# Patient Record
Sex: FEMALE | Race: BLACK OR AFRICAN AMERICAN | NOT HISPANIC OR LATINO | Employment: UNEMPLOYED | URBAN - METROPOLITAN AREA
[De-identification: names, ages, dates, MRNs, and addresses within clinical notes are randomized per-mention and may not be internally consistent; named-entity substitution may affect disease eponyms.]

---

## 2017-12-10 ENCOUNTER — HOSPITAL ENCOUNTER (EMERGENCY)
Facility: HOSPITAL | Age: 37
Discharge: HOME | End: 2017-12-11
Attending: EMERGENCY MEDICINE
Payer: MEDICAID

## 2017-12-10 DIAGNOSIS — R05.9 COUGH: Primary | ICD-10-CM

## 2017-12-10 LAB
FLUAV AG SPEC QL IA: NEGATIVE
FLUBV AG SPEC QL IA: NEGATIVE
SPECIMEN SOURCE: NORMAL

## 2017-12-10 PROCEDURE — 63600175 PHARM REV CODE 636 W HCPCS: Performed by: EMERGENCY MEDICINE

## 2017-12-10 PROCEDURE — 25000003 PHARM REV CODE 250: Performed by: EMERGENCY MEDICINE

## 2017-12-10 PROCEDURE — 87400 INFLUENZA A/B EACH AG IA: CPT

## 2017-12-10 PROCEDURE — 99283 EMERGENCY DEPT VISIT LOW MDM: CPT

## 2017-12-10 RX ORDER — PREDNISONE 20 MG/1
60 TABLET ORAL
Status: COMPLETED | OUTPATIENT
Start: 2017-12-10 | End: 2017-12-10

## 2017-12-10 RX ORDER — BENZONATATE 100 MG/1
200 CAPSULE ORAL
Status: COMPLETED | OUTPATIENT
Start: 2017-12-10 | End: 2017-12-10

## 2017-12-10 RX ORDER — CETIRIZINE HYDROCHLORIDE 10 MG/1
10 TABLET ORAL
Status: COMPLETED | OUTPATIENT
Start: 2017-12-10 | End: 2017-12-10

## 2017-12-10 RX ADMIN — PREDNISONE 60 MG: 20 TABLET ORAL at 11:12

## 2017-12-10 RX ADMIN — CETIRIZINE HYDROCHLORIDE 10 MG: 10 TABLET, FILM COATED ORAL at 11:12

## 2017-12-10 RX ADMIN — BENZONATATE 200 MG: 100 CAPSULE ORAL at 11:12

## 2017-12-11 VITALS
DIASTOLIC BLOOD PRESSURE: 85 MMHG | HEIGHT: 62 IN | SYSTOLIC BLOOD PRESSURE: 133 MMHG | TEMPERATURE: 98 F | HEART RATE: 88 BPM | RESPIRATION RATE: 20 BRPM | WEIGHT: 210 LBS | OXYGEN SATURATION: 95 % | BODY MASS INDEX: 38.64 KG/M2

## 2017-12-11 RX ORDER — BENZONATATE 200 MG/1
200 CAPSULE ORAL 3 TIMES DAILY PRN
Qty: 30 CAPSULE | Refills: 0 | Status: SHIPPED | OUTPATIENT
Start: 2017-12-11 | End: 2017-12-21

## 2017-12-11 NOTE — DISCHARGE INSTRUCTIONS
Take an antihistamine like zyrtec or allegra or claritin daily.  Take Mucinex, or Mucinex DM or Delsym.  You may use Afrin for congestion.  Drink plenty of clear fluids.  You were given a dose of steroids in the ER.

## 2017-12-11 NOTE — ED PROVIDER NOTES
Encounter Date: 12/10/2017       History     Chief Complaint   Patient presents with    Shortness of Breath     reports SOB since last night. Denies any past medical hx. States has been wheezing. Pt reports has been having cough X 2 days, nonproductive     37F presents with cough and SOB x 2 days.  She has been taking Mucinex for her cough.  Now her ribs are sore.  She also reports associated runny nose and congestion.  No fever.  Sometimes with coughing she feels short of breath.  She thinks she may also wheeze at times.            Review of patient's allergies indicates:   Allergen Reactions    Ibuprofen Shortness Of Breath    Naproxen Shortness Of Breath     History reviewed. No pertinent past medical history.  Past Surgical History:   Procedure Laterality Date     SECTION      HYSTERECTOMY       History reviewed. No pertinent family history.  Social History   Substance Use Topics    Smoking status: Never Smoker    Smokeless tobacco: Never Used    Alcohol use No     Review of Systems   Constitutional: Negative for fever.   HENT: Positive for congestion and rhinorrhea.    Respiratory: Positive for cough and shortness of breath.    All other systems reviewed and are negative.      Physical Exam     Initial Vitals [12/10/17 2159]   BP Pulse Resp Temp SpO2   (!) 139/97 90 20 97.7 °F (36.5 °C) 97 %      MAP       111         Physical Exam    Nursing note and vitals reviewed.  Constitutional: She appears well-developed and well-nourished. No distress.   HENT:   Head: Normocephalic and atraumatic.   Right Ear: Tympanic membrane normal.   Left Ear: Tympanic membrane normal.   Mouth/Throat: Oropharynx is clear and moist.   Eyes: Conjunctivae are normal.   Neck: Normal range of motion.   Cardiovascular: Normal rate, regular rhythm and normal heart sounds.   No murmur heard.  Pulmonary/Chest: Breath sounds normal. No respiratory distress. She has no wheezes. She has no rhonchi. She has no rales.    Musculoskeletal: Normal range of motion.   Neurological: She is alert and oriented to person, place, and time.   Skin: Skin is warm and dry.   Psychiatric: She has a normal mood and affect. Her behavior is normal.         ED Course   Procedures  Labs Reviewed   INFLUENZA A AND B ANTIGEN             Medical Decision Making:   Clinical Tests:   Lab Tests: Ordered and Reviewed  Radiological Study: Ordered and Reviewed  ED Management:  37F with cough, congestion, runny nose and rib soreness.  No fever, no OM or exudative pharyngitis on exam.  Flu negative and no infiltrate on CXR.  I will treat with PO steroid dose in ER, antihistamines, and tessalon perles.  Symptomatic treatment.                   ED Course      Clinical Impression:   The encounter diagnosis was Cough.                           Paula Castle MD  12/11/17 0058

## 2017-12-11 NOTE — ED NOTES
Pt to ED with complaints of shortness of breath. States it started last night when she was helping to put her Pj tree up. Pt states she has been having a nonproductive cough that is frequent for about 2 days. States she was showering tonight trying to help loosen her chest congestion when she felt short of breath. States she is less SOB now that she has rested, but pt continuing to cough frequently at this time as well. States she last felt this way when she was diagnosed with bronchitis a few years ago.

## 2017-12-11 NOTE — ED NOTES
Pt states she does not feel better at this time as far as her cough in concerned, but she states she is able to breath better at this time. Denies SOB at this time.

## 2024-10-24 ENCOUNTER — HOSPITAL ENCOUNTER (EMERGENCY)
Facility: HOSPITAL | Age: 44
Discharge: HOME | End: 2024-10-25
Attending: EMERGENCY MEDICINE
Payer: MEDICAID

## 2024-10-24 DIAGNOSIS — R05.9 COUGH: ICD-10-CM

## 2024-10-24 DIAGNOSIS — R00.0 TACHYCARDIA: ICD-10-CM

## 2024-10-24 DIAGNOSIS — J40 BRONCHITIS: Primary | ICD-10-CM

## 2024-10-24 PROCEDURE — 94640 AIRWAY INHALATION TREATMENT: CPT

## 2024-10-24 PROCEDURE — 93010 ELECTROCARDIOGRAM REPORT: CPT | Mod: ,,, | Performed by: INTERNAL MEDICINE

## 2024-10-24 PROCEDURE — 99285 EMERGENCY DEPT VISIT HI MDM: CPT | Mod: 25

## 2024-10-24 PROCEDURE — 93005 ELECTROCARDIOGRAM TRACING: CPT

## 2024-10-24 PROCEDURE — 63600175 PHARM REV CODE 636 W HCPCS: Performed by: EMERGENCY MEDICINE

## 2024-10-24 PROCEDURE — 25000242 PHARM REV CODE 250 ALT 637 W/ HCPCS: Performed by: EMERGENCY MEDICINE

## 2024-10-24 RX ORDER — PREDNISONE 20 MG/1
60 TABLET ORAL
Status: COMPLETED | OUTPATIENT
Start: 2024-10-24 | End: 2024-10-24

## 2024-10-24 RX ORDER — IPRATROPIUM BROMIDE AND ALBUTEROL SULFATE 2.5; .5 MG/3ML; MG/3ML
3 SOLUTION RESPIRATORY (INHALATION)
Status: COMPLETED | OUTPATIENT
Start: 2024-10-24 | End: 2024-10-24

## 2024-10-24 RX ADMIN — IPRATROPIUM BROMIDE AND ALBUTEROL SULFATE 3 ML: .5; 3 SOLUTION RESPIRATORY (INHALATION) at 11:10

## 2024-10-24 RX ADMIN — PREDNISONE 60 MG: 20 TABLET ORAL at 11:10

## 2024-10-25 VITALS
OXYGEN SATURATION: 99 % | RESPIRATION RATE: 19 BRPM | WEIGHT: 291 LBS | TEMPERATURE: 98 F | SYSTOLIC BLOOD PRESSURE: 141 MMHG | HEART RATE: 107 BPM | HEIGHT: 61 IN | BODY MASS INDEX: 54.94 KG/M2 | DIASTOLIC BLOOD PRESSURE: 89 MMHG

## 2024-10-25 RX ORDER — PROMETHAZINE HYDROCHLORIDE AND DEXTROMETHORPHAN HYDROBROMIDE 6.25; 15 MG/5ML; MG/5ML
7.5 SYRUP ORAL EVERY 4 HOURS PRN
Qty: 118 ML | Refills: 0 | Status: SHIPPED | OUTPATIENT
Start: 2024-10-25 | End: 2024-11-04

## 2024-10-25 RX ORDER — ALBUTEROL SULFATE 2.5 MG/.5ML
5 SOLUTION RESPIRATORY (INHALATION) EVERY 4 HOURS PRN
Qty: 30 EACH | Refills: 1 | Status: SHIPPED | OUTPATIENT
Start: 2024-10-25 | End: 2025-10-25

## 2024-10-25 RX ORDER — PREDNISONE 50 MG/1
50 TABLET ORAL DAILY
Qty: 5 TABLET | Refills: 0 | Status: SHIPPED | OUTPATIENT
Start: 2024-10-25 | End: 2024-10-30

## 2024-10-25 RX ORDER — ALBUTEROL SULFATE 90 UG/1
1-2 INHALANT RESPIRATORY (INHALATION) EVERY 6 HOURS PRN
Qty: 18 G | Refills: 1 | Status: SHIPPED | OUTPATIENT
Start: 2024-10-25 | End: 2025-10-25

## 2024-10-30 LAB
OHS QRS DURATION: 66 MS
OHS QRS DURATION: 74 MS
OHS QTC CALCULATION: 422 MS
OHS QTC CALCULATION: 431 MS